# Patient Record
Sex: MALE | Race: BLACK OR AFRICAN AMERICAN | NOT HISPANIC OR LATINO | Employment: FULL TIME | URBAN - METROPOLITAN AREA
[De-identification: names, ages, dates, MRNs, and addresses within clinical notes are randomized per-mention and may not be internally consistent; named-entity substitution may affect disease eponyms.]

---

## 2023-12-01 ENCOUNTER — HOSPITAL ENCOUNTER (EMERGENCY)
Facility: HOSPITAL | Age: 48
Discharge: HOME | End: 2023-12-01
Attending: EMERGENCY MEDICINE
Payer: COMMERCIAL

## 2023-12-01 VITALS
RESPIRATION RATE: 16 BRPM | DIASTOLIC BLOOD PRESSURE: 95 MMHG | HEIGHT: 69 IN | BODY MASS INDEX: 28.14 KG/M2 | WEIGHT: 190 LBS | HEART RATE: 69 BPM | OXYGEN SATURATION: 98 % | TEMPERATURE: 99 F | SYSTOLIC BLOOD PRESSURE: 154 MMHG

## 2023-12-01 DIAGNOSIS — R10.11 RUQ ABDOMINAL PAIN: ICD-10-CM

## 2023-12-01 DIAGNOSIS — R10.13 EPIGASTRIC ABDOMINAL PAIN: ICD-10-CM

## 2023-12-01 DIAGNOSIS — K76.9 LIVER LESION: ICD-10-CM

## 2023-12-01 DIAGNOSIS — K21.00 GASTROESOPHAGEAL REFLUX DISEASE WITH ESOPHAGITIS WITHOUT HEMORRHAGE: Primary | ICD-10-CM

## 2023-12-01 LAB
ALBUMIN SERPL BCP-MCNC: 4.2 G/DL (ref 3.5–5.2)
ALP SERPL-CCNC: 68 U/L (ref 55–135)
ALT SERPL W/O P-5'-P-CCNC: 27 U/L (ref 10–44)
ANION GAP SERPL CALC-SCNC: 8 MMOL/L (ref 8–16)
AST SERPL-CCNC: 15 U/L (ref 10–40)
BASOPHILS # BLD AUTO: 0.03 K/UL (ref 0–0.2)
BASOPHILS NFR BLD: 0.6 % (ref 0–1.9)
BILIRUB SERPL-MCNC: 0.9 MG/DL (ref 0.1–1)
BUN SERPL-MCNC: 12 MG/DL (ref 6–20)
CALCIUM SERPL-MCNC: 9.4 MG/DL (ref 8.7–10.5)
CHLORIDE SERPL-SCNC: 106 MMOL/L (ref 95–110)
CO2 SERPL-SCNC: 27 MMOL/L (ref 23–29)
CREAT SERPL-MCNC: 0.9 MG/DL (ref 0.5–1.4)
DIFFERENTIAL METHOD: ABNORMAL
EOSINOPHIL # BLD AUTO: 0.2 K/UL (ref 0–0.5)
EOSINOPHIL NFR BLD: 3.3 % (ref 0–8)
ERYTHROCYTE [DISTWIDTH] IN BLOOD BY AUTOMATED COUNT: 14 % (ref 11.5–14.5)
EST. GFR  (NO RACE VARIABLE): >60 ML/MIN/1.73 M^2
GLUCOSE SERPL-MCNC: 98 MG/DL (ref 70–110)
HCT VFR BLD AUTO: 39.9 % (ref 40–54)
HGB BLD-MCNC: 12.8 G/DL (ref 14–18)
IMM GRANULOCYTES # BLD AUTO: 0 K/UL (ref 0–0.04)
IMM GRANULOCYTES NFR BLD AUTO: 0 % (ref 0–0.5)
LIPASE SERPL-CCNC: 23 U/L (ref 4–60)
LYMPHOCYTES # BLD AUTO: 1.2 K/UL (ref 1–4.8)
LYMPHOCYTES NFR BLD: 24.7 % (ref 18–48)
MCH RBC QN AUTO: 29 PG (ref 27–31)
MCHC RBC AUTO-ENTMCNC: 32.1 G/DL (ref 32–36)
MCV RBC AUTO: 91 FL (ref 82–98)
MONOCYTES # BLD AUTO: 0.5 K/UL (ref 0.3–1)
MONOCYTES NFR BLD: 9.6 % (ref 4–15)
NEUTROPHILS # BLD AUTO: 3 K/UL (ref 1.8–7.7)
NEUTROPHILS NFR BLD: 61.8 % (ref 38–73)
NRBC BLD-RTO: 0 /100 WBC
PLATELET # BLD AUTO: 220 K/UL (ref 150–450)
PMV BLD AUTO: 10.6 FL (ref 9.2–12.9)
POTASSIUM SERPL-SCNC: 3.6 MMOL/L (ref 3.5–5.1)
PROT SERPL-MCNC: 7.4 G/DL (ref 6–8.4)
RBC # BLD AUTO: 4.41 M/UL (ref 4.6–6.2)
SODIUM SERPL-SCNC: 141 MMOL/L (ref 136–145)
TROPONIN I SERPL HS-MCNC: 4.4 PG/ML (ref 0–14.9)
WBC # BLD AUTO: 4.78 K/UL (ref 3.9–12.7)

## 2023-12-01 PROCEDURE — 96374 THER/PROPH/DIAG INJ IV PUSH: CPT

## 2023-12-01 PROCEDURE — 93010 ELECTROCARDIOGRAM REPORT: CPT | Mod: ,,, | Performed by: GENERAL PRACTICE

## 2023-12-01 PROCEDURE — 93005 ELECTROCARDIOGRAM TRACING: CPT | Performed by: GENERAL PRACTICE

## 2023-12-01 PROCEDURE — 80053 COMPREHEN METABOLIC PANEL: CPT | Performed by: EMERGENCY MEDICINE

## 2023-12-01 PROCEDURE — 99285 EMERGENCY DEPT VISIT HI MDM: CPT | Mod: 25

## 2023-12-01 PROCEDURE — 83690 ASSAY OF LIPASE: CPT | Performed by: EMERGENCY MEDICINE

## 2023-12-01 PROCEDURE — 84484 ASSAY OF TROPONIN QUANT: CPT | Performed by: EMERGENCY MEDICINE

## 2023-12-01 PROCEDURE — 85025 COMPLETE CBC W/AUTO DIFF WBC: CPT | Performed by: EMERGENCY MEDICINE

## 2023-12-01 PROCEDURE — 25000003 PHARM REV CODE 250: Performed by: EMERGENCY MEDICINE

## 2023-12-01 PROCEDURE — 93010 EKG 12-LEAD: ICD-10-PCS | Mod: ,,, | Performed by: GENERAL PRACTICE

## 2023-12-01 RX ORDER — LIDOCAINE HYDROCHLORIDE 20 MG/ML
15 SOLUTION OROPHARYNGEAL ONCE
Status: COMPLETED | OUTPATIENT
Start: 2023-12-01 | End: 2023-12-01

## 2023-12-01 RX ORDER — PANTOPRAZOLE SODIUM 40 MG/1
40 TABLET, DELAYED RELEASE ORAL DAILY
Qty: 30 TABLET | Refills: 11 | Status: SHIPPED | OUTPATIENT
Start: 2023-12-01 | End: 2024-11-30

## 2023-12-01 RX ORDER — MAG HYDROX/ALUMINUM HYD/SIMETH 200-200-20
30 SUSPENSION, ORAL (FINAL DOSE FORM) ORAL ONCE
Status: COMPLETED | OUTPATIENT
Start: 2023-12-01 | End: 2023-12-01

## 2023-12-01 RX ORDER — FAMOTIDINE 10 MG/ML
20 INJECTION INTRAVENOUS
Status: COMPLETED | OUTPATIENT
Start: 2023-12-01 | End: 2023-12-01

## 2023-12-01 RX ADMIN — FAMOTIDINE 20 MG: 10 INJECTION, SOLUTION INTRAVENOUS at 04:12

## 2023-12-01 RX ADMIN — ALUMINUM HYDROXIDE, MAGNESIUM HYDROXIDE, AND SIMETHICONE 30 ML: 200; 200; 20 SUSPENSION ORAL at 04:12

## 2023-12-01 RX ADMIN — LIDOCAINE HYDROCHLORIDE 15 ML: 20 SOLUTION ORAL at 04:12

## 2023-12-02 NOTE — ED PROVIDER NOTES
Encounter Date: 12/1/2023       History     Chief Complaint   Patient presents with    Abdominal Pain     X a few weeks seen at Urgent Care, meds not working      48-year-old male complaining epigastric abdominal pain.  This has been ongoing the last several weeks.  It was dull and burning and radiates up into his lower chest.  It was worse after eating.  He was seen at urgent care and prescribed pantoprazole and Carafate and referred to GI clinic.  He completed a course of these medications but is still having symptoms.  He has not appointment with GI on Monday but wanted to come here to make sure nothing else was going on.    The history is provided by the patient.     Review of patient's allergies indicates:  No Known Allergies  No past medical history on file.  No past surgical history on file.  No family history on file.     Review of Systems   Gastrointestinal:  Positive for abdominal pain.   All other systems reviewed and are negative.      Physical Exam     Initial Vitals [12/01/23 1609]   BP Pulse Resp Temp SpO2   (!) 148/107 64 17 98.3 °F (36.8 °C) 97 %      MAP       --         Physical Exam    Nursing note and vitals reviewed.  Constitutional: He appears well-developed and well-nourished. He is not diaphoretic. No distress.   HENT:   Head: Normocephalic and atraumatic.   Eyes: Conjunctivae are normal.   Neck: Neck supple.   Normal range of motion.  Cardiovascular:  Normal rate.           Pulmonary/Chest: No respiratory distress.   Abdominal: He exhibits no distension. There is abdominal tenderness.   Mild epigastric tenderness, no rebound or guarding   Musculoskeletal:         General: No edema.      Cervical back: Normal range of motion and neck supple.     Neurological: He is alert. He has normal strength.   Skin: Skin is warm and dry. No rash noted. No erythema.   Psychiatric: He has a normal mood and affect.         ED Course   Procedures  Labs Reviewed   CBC W/ AUTO DIFFERENTIAL - Abnormal; Notable  for the following components:       Result Value    RBC 4.41 (*)     Hemoglobin 12.8 (*)     Hematocrit 39.9 (*)     All other components within normal limits   COMPREHENSIVE METABOLIC PANEL   LIPASE   TROPONIN I HIGH SENSITIVITY        ECG Results              EKG 12-lead (In process)  Result time 12/01/23 17:19:58      In process by Interface, Lab In [facility] (12/01/23 17:19:58)                   Narrative:    Test Reason : R10.11,    Vent. Rate : 064 BPM     Atrial Rate : 064 BPM     P-R Int : 142 ms          QRS Dur : 102 ms      QT Int : 416 ms       P-R-T Axes : 040 001 -05 degrees     QTc Int : 429 ms    Normal sinus rhythm  Incomplete right bundle branch block  Borderline Abnormal ECG  When compared with ECG of 07-APR-1994 13:42,  Incomplete right bundle branch block is now Present  Non-specific change in ST segment in Inferior leads  T wave inversion now evident in Inferior leads  T wave amplitude has decreased in Anterior-lateral leads    Referred By: [email or web address]   SELF           Confirmed By:                                   Imaging Results              US Abdomen Limited (Final result)  Result time 12/01/23 20:56:58      Final result by Kvng Tirado MD (12/01/23 20:56:58)                   Narrative:    EXAM DESCRIPTION: US ABDOMEN LIMITED 12/1/2023 5:27 PM CST    CLINICAL HISTORY: 48 years Male, RUQ abdominal pain; ;    COMPARISON:  None.    FINDINGS:  Visualized portions of the pancreas appear normal in echogenicity.    Visualized portions of the abdominal aorta and IVC show no suspicious finding.    The liver is diffusely echogenic and coarsened in neck texture. It measures 15.9 cm in length. Antegrade flow is documented within the main portal vein. An area of geographic hypoechogenicity is noted adjacent to the gallbladder fossa measuring 2.1 x 1.7 x 1.4 cm in size.    Gallbladder wall thickness measures 2 mm. No gallstones. Common bile duct diameter measures 5 mm.    Right kidney measures 10.3 x 5.1  x 5.7 cm in size. No hydronephrosis.    IMPRESSION:  No acute sonographic finding.    Echogenic liver, suggestive of hepatic steatosis. Concomitant geographic area of hypoechogenicity about the liver parenchyma adjacent to the gallbladder fossa measuring 2.1 x 1.7 x 1.4 cm in size presumably corresponds to an area of focal fatty sparing. Multiphasic liver MRI would be confirmatory.    Electronically signed by:  Kvng Tirado MD  12/01/2023 08:56 PM CST Workstation: [id number]                                     X-Ray Chest AP Portable (Final result)  Result time 12/01/23 18:08:20      Final result by Hai Flowers MD (12/01/23 18:08:20)                   Narrative:      EXAM: XR CHEST AP PORTABLE    HISTORY: Right upper quadrant pain.    COMPARISON:None    FINDINGS: A single AP view of the chest was obtained. The lungs are well-expanded and clear. There are no pleural effusions. There is no pneumothorax. The heart is borderline enlarged.    IMPRESSION:   No evidence of acute disease within the chest.    Electronically signed by:  Hai Flowers MD  12/01/2023 06:08 PM CST Workstation: [id number]                                     Medications   aluminum-magnesium hydroxide-simethicone 200-200-20 mg/5 mL suspension 30 mL (30 mLs Oral Given 12/1/23 1655)     And   LIDOcaine HCl 2% oral solution 15 mL (15 mLs Oral Given 12/1/23 1655)   famotidine (PF) injection 20 mg (20 mg Intravenous Given 12/1/23 1649)     Medical Decision Making  Abdominal exam is very benign.  No leukocytosis.  H&H is normal.  LFTs, lipase are normal.  Right upper quadrant ultrasound shows normal gallbladder.  It does show hepatomegaly as well as discrete area of decreased echogenicity and recommend outpatient MRI for further characterization.  I discussed these findings with the patient.  He did have improvement in his symptoms after GI cocktail and IV Pepcid.  I will refill his pantoprazole and he will follow up with GI as  scheduled.    Amount and/or Complexity of Data Reviewed  Labs: ordered.  Radiology: ordered.    Risk  OTC drugs.  Prescription drug management.                                      Clinical Impression:  Final diagnoses:  [R10.11] Epigastric abdominal pain  [R10.13] Epigastric abdominal pain  [K21.00] Gastroesophageal reflux disease with esophagitis without hemorrhage (Primary)  [K76.9] Liver lesion          ED Disposition Condition    Discharge Stable          ED Prescriptions       Medication Sig Dispense Start Date End Date Auth. Provider    pantoprazole (PROTONIX) 40 MG tablet Take 1 tablet (40 mg total) by mouth once daily. 30 tablet 12/1/2023 11/30/2024 Gordon Gay MD          Follow-up Information       Follow up With Specialties Details Why Contact Info    Gastroenterology clinic                 Gordon Gay MD  12/01/23 0790

## 2023-12-02 NOTE — DISCHARGE INSTRUCTIONS
You will need an outpatient MRI for further evaluation of the abnormal liver ultrasound. Follow-up with gastroenterology clinic on Monday as scheduled.

## 2023-12-04 ENCOUNTER — OFFICE VISIT (OUTPATIENT)
Dept: GASTROENTEROLOGY | Facility: CLINIC | Age: 48
End: 2023-12-04
Payer: COMMERCIAL

## 2023-12-04 VITALS
WEIGHT: 194 LBS | SYSTOLIC BLOOD PRESSURE: 145 MMHG | DIASTOLIC BLOOD PRESSURE: 99 MMHG | HEART RATE: 72 BPM | HEIGHT: 69 IN | BODY MASS INDEX: 28.73 KG/M2

## 2023-12-04 DIAGNOSIS — Z12.11 SCREENING FOR COLON CANCER: ICD-10-CM

## 2023-12-04 DIAGNOSIS — R10.13 EPIGASTRIC PAIN: Primary | ICD-10-CM

## 2023-12-04 DIAGNOSIS — R11.0 NAUSEA: ICD-10-CM

## 2023-12-04 DIAGNOSIS — K76.0 FATTY LIVER: ICD-10-CM

## 2023-12-04 DIAGNOSIS — R19.7 DIARRHEA, UNSPECIFIED TYPE: ICD-10-CM

## 2023-12-04 DIAGNOSIS — Z85.46 HISTORY OF PROSTATE CANCER: ICD-10-CM

## 2023-12-04 DIAGNOSIS — K62.5 RECTAL BLEED: ICD-10-CM

## 2023-12-04 DIAGNOSIS — R19.8 IRREGULAR BOWEL HABITS: ICD-10-CM

## 2023-12-04 PROCEDURE — 1159F MED LIST DOCD IN RCRD: CPT | Mod: CPTII,S$GLB,,

## 2023-12-04 PROCEDURE — 3008F PR BODY MASS INDEX (BMI) DOCUMENTED: ICD-10-PCS | Mod: CPTII,S$GLB,,

## 2023-12-04 PROCEDURE — 99999 PR PBB SHADOW E&M-EST. PATIENT-LVL III: CPT | Mod: PBBFAC,,,

## 2023-12-04 PROCEDURE — 99203 PR OFFICE/OUTPT VISIT, NEW, LEVL III, 30-44 MIN: ICD-10-PCS | Mod: S$GLB,,,

## 2023-12-04 PROCEDURE — 3077F SYST BP >= 140 MM HG: CPT | Mod: CPTII,S$GLB,,

## 2023-12-04 PROCEDURE — 3008F BODY MASS INDEX DOCD: CPT | Mod: CPTII,S$GLB,,

## 2023-12-04 PROCEDURE — 3080F PR MOST RECENT DIASTOLIC BLOOD PRESSURE >= 90 MM HG: ICD-10-PCS | Mod: CPTII,S$GLB,,

## 2023-12-04 PROCEDURE — 1160F PR REVIEW ALL MEDS BY PRESCRIBER/CLIN PHARMACIST DOCUMENTED: ICD-10-PCS | Mod: CPTII,S$GLB,,

## 2023-12-04 PROCEDURE — 99999 PR PBB SHADOW E&M-EST. PATIENT-LVL III: ICD-10-PCS | Mod: PBBFAC,,,

## 2023-12-04 PROCEDURE — 1159F PR MEDICATION LIST DOCUMENTED IN MEDICAL RECORD: ICD-10-PCS | Mod: CPTII,S$GLB,,

## 2023-12-04 PROCEDURE — 99203 OFFICE O/P NEW LOW 30 MIN: CPT | Mod: S$GLB,,,

## 2023-12-04 PROCEDURE — 3080F DIAST BP >= 90 MM HG: CPT | Mod: CPTII,S$GLB,,

## 2023-12-04 PROCEDURE — 3077F PR MOST RECENT SYSTOLIC BLOOD PRESSURE >= 140 MM HG: ICD-10-PCS | Mod: CPTII,S$GLB,,

## 2023-12-04 PROCEDURE — 1160F RVW MEDS BY RX/DR IN RCRD: CPT | Mod: CPTII,S$GLB,,

## 2023-12-04 RX ORDER — SUCRALFATE 1 G/1
1 TABLET ORAL 4 TIMES DAILY
COMMUNITY
Start: 2023-11-18

## 2023-12-04 NOTE — PROGRESS NOTES
Subjective:       Patient ID: Fede Dobbs is a 48 y.o. male Body mass index is 28.65 kg/m².    Chief Complaint: Abdominal Pain    This patient is new to me.  Referring Provider: Manuel Garza for abdominal pain.       ED visit 12/1/23  Medical Decision Making  Abdominal exam is very benign.  No leukocytosis.  H&H is normal.  LFTs, lipase are normal.  Right upper quadrant ultrasound shows normal gallbladder.  It does show hepatomegaly as well as discrete area of decreased echogenicity and recommend outpatient MRI for further characterization.  I discussed these findings with the patient.  He did have improvement in his symptoms after GI cocktail and IV Pepcid.  I will refill his pantoprazole and he will follow up with GI as scheduled.           Abdominal Pain  This is a new (started in November) problem. Episode onset: Pt presented to ED on 12/1/23 pt was seen at urgent care prior to ED and prescribed pantoprazole and Carafate,  He completed a course of these medications but is still having symptoms. The problem occurs daily. The problem has been unchanged. The pain is located in the epigastric region. The pain is at a severity of 3/10. The quality of the pain is burning and dull. The abdominal pain does not radiate. Associated symptoms include constipation (reports minor constipation when taking carafate but states, stool appeared like type 1 on bristol stool scale, typically has a BM once-twice a day rates stool type 4 on bristol stool scale, currently having diarrhea), diarrhea (reports diarrhea around the time abdominal pain started, states has 4-6bms daily occuring after eating, denies antibiotic use changes in medications travel ill contacts hospitalizations, denies nocturnal diarrhea), hematochezia (recently seen BRBPR on tissue small amount, not mixed in stool, no blood in toilet bowl, no hx of hemorrhoids, no rectal pain, last colonoscopy 2021 in [facility] no colon polyps found no issues  found) and nausea (c/o mild nausea with dry heaves). Pertinent negatives include no anorexia, arthralgias, belching, dysuria, fever, flatus, frequency, headaches, hematuria, melena, myalgias, vomiting or weight loss. Associated symptoms comments: Patient lives in [facility] currently here in [place] caring for his father, states his last colonoscopy was in 2021 in [place] does not have any history of colon polyps denies family history of colon cancer patient does not have a copy of last colonoscopy form of release signed to get copy of last colonoscopy.  -patient reports history of prostate cancer had prostate surgery in 2021 had to do radiation and chemo injections last 1 was in January of 2023 patient is seen by urologist Dr.Andrew Matamoros in [facility]. The pain is aggravated by eating (eating in general). He has tried proton pump inhibitors (has been taking pantoprazole 40mg orally daily since saturday, states did not find carafate helpful) for the symptoms. Prior diagnostic workup includes ultrasound (IMPRESSION:Echogenic liver,suggestive of hepatic steatosis Concomitant geographic area of hypoechogenicity about the liver parenchyma adjacent to the gallbladderfossa measuring2.1x1.7 x 1.4cm in size presumably corresponds to an area of focal fatty sparing). There is no history of abdominal surgery, colon cancer, Crohn's disease, gallstones, GERD (denies GERD symptoms), irritable bowel syndrome, pancreatitis, PUD (denies hx of ulcers or chronic nsaid use) or ulcerative colitis. Patient's medical history does not include kidney stones and UTI.       Review of Systems   Constitutional:  Negative for activity change, appetite change, fever, unexpected weight change and weight loss.   Gastrointestinal:  Positive for abdominal pain, anal bleeding, constipation (reports minor constipation when taking carafate but states, stool appeared like type 1 on bristol stool scale, typically has a BM  once-twice a day rates stool type 4 on bristol stool scale, currently having diarrhea), diarrhea (reports diarrhea around the time abdominal pain started, states has 4-6bms daily occuring after eating, denies antibiotic use changes in medications travel ill contacts hospitalizations, denies nocturnal diarrhea), hematochezia (recently seen BRBPR on tissue small amount, not mixed in stool, no blood in toilet bowl, no hx of hemorrhoids, no rectal pain, last colonoscopy 2021 in [facility] no colon polyps found no issues found) and nausea (c/o mild nausea with dry heaves). Negative for abdominal distention, anorexia, blood in stool, flatus, melena, rectal pain and vomiting.   Genitourinary:  Negative for dysuria, frequency and hematuria.   Musculoskeletal:  Negative for arthralgias and myalgias.   Neurological:  Negative for headaches.         No LMP for male patient.  History reviewed. No pertinent past medical history.  Past Surgical History:   Procedure Laterality Date    COLONOSCOPY      2021     Family History   Problem Relation Age of Onset    Colon cancer Neg Hx     Colon polyps Neg Hx     Crohn's disease Neg Hx     Ulcerative colitis Neg Hx         Wt Readings from Last 10 Encounters:   12/04/23 88 kg (194 lb 0.1 oz)   12/01/23 86.2 kg (190 lb)     Lab Results   Component Value Date    WBC 4.78 12/01/2023    HGB 12.8 (L) 12/01/2023    HCT 39.9 (L) 12/01/2023    MCV 91 12/01/2023     12/01/2023     CMP  Sodium   Date Value Ref Range Status   12/01/2023 141 136 - 145 mmol/L Final     Potassium   Date Value Ref Range Status   12/01/2023 3.6 3.5 - 5.1 mmol/L Final     Chloride   Date Value Ref Range Status   12/01/2023 106 95 - 110 mmol/L Final     CO2   Date Value Ref Range Status   12/01/2023 27 23 - 29 mmol/L Final     Glucose   Date Value Ref Range Status   12/01/2023 98 70 - 110 mg/dL Final     BUN   Date Value Ref Range Status   12/01/2023 12 6 - 20 mg/dL Final     Creatinine   Date Value Ref  "Range Status   12/01/2023 0.9 0.5 - 1.4 mg/dL Final     Calcium   Date Value Ref Range Status   12/01/2023 9.4 8.7 - 10.5 mg/dL Final     Total Protein   Date Value Ref Range Status   12/01/2023 7.4 6.0 - 8.4 g/dL Final     Albumin   Date Value Ref Range Status   12/01/2023 4.2 3.5 - 5.2 g/dL Final     Total Bilirubin   Date Value Ref Range Status   12/01/2023 0.9 0.1 - 1.0 mg/dL Final     Comment:     For infants and newborns, interpretation of results should be based  on gestational age, weight and in agreement with clinical  observations.    Premature Infant recommended reference ranges:  Up to 24 hours.............<8.0 mg/dL  Up to 48 hours............<12.0 mg/dL  3-5 days..................<15.0 mg/dL  6-29 days.................<15.0 mg/dL       Alkaline Phosphatase   Date Value Ref Range Status   12/01/2023 68 55 - 135 U/L Final     AST   Date Value Ref Range Status   12/01/2023 15 10 - 40 U/L Final     ALT   Date Value Ref Range Status   12/01/2023 27 10 - 44 U/L Final     Anion Gap   Date Value Ref Range Status   12/01/2023 8 8 - 16 mmol/L Final     Lab Results   Component Value Date    LIPASE 23 12/01/2023     No results found for: "LIPASERES"  No results found for: "TSH"    Reviewed prior medical records including hospital encounter 12/01/2023 radiology report of ultrasound abdomen 12/01/2023 & endoscopy history (see surgical history/procedures).    Objective:      Physical Exam  Vitals and nursing note reviewed.   Constitutional:       Appearance: Normal appearance. He is normal weight.   Cardiovascular:      Rate and Rhythm: Normal rate and regular rhythm.      Heart sounds: Normal heart sounds.   Pulmonary:      Breath sounds: Normal breath sounds.   Abdominal:      General: Bowel sounds are normal.      Palpations: Abdomen is soft.      Tenderness: There is no abdominal tenderness.   Skin:     General: Skin is warm and dry.      Coloration: Skin is not jaundiced.   Neurological:      Mental Status: He " is alert and oriented to person, place, and time.   Psychiatric:         Mood and Affect: Mood normal.         Behavior: Behavior normal.         Assessment:       1. Epigastric pain    2. Nausea    3. Diarrhea, unspecified type    4. Irregular bowel habits    5. Rectal bleed    6. Screening for colon cancer    7. Fatty liver    8. History of prostate cancer        Plan:       Epigastric pain  -     Case Request Endoscopy: EGD (ESOPHAGOGASTRODUODENOSCOPY)  - schedule EGD, discussed procedure with patient, including risks and benefits, patient verbalized understanding  - START Pantoprazole 40mg tablet orally twice a day before breakfast and before bedtime daily for 30 days after continue Pantoprazole 40mg orally daily  - Consider pepcid  - Avoid acidic foods attached to AVS examples of acidic foods   -Avoid/minimize NSAIDs such as ibuprofen (Advil, Motrin) and naproxen (Aleve and Naprosyn).    Nausea  -     Case Request Endoscopy: EGD (ESOPHAGOGASTRODUODENOSCOPY)  - Consider zofran  - Increase pantoprazole to twice daily    Diarrhea, unspecified type, Irregular bowel habits   -     Giardia / Cryptosporidum, EIA; Future; Expected date: 12/04/2023  -     Stool Exam-Ova,Cysts,Parasites; Future; Expected date: 12/04/2023  -     Clostridium difficile EIA; Future; Expected date: 12/04/2023  -     Stool culture; Future; Expected date: 12/04/2023  - - recommend OTC probiotic, such as Florastor or Culturelle, taken as directed on packaging  -  avoid lactose, alcohol, & caffeine  -  avoid known triggers  - Educated patient on If symptoms do not improve recommended a colonoscopy     Rectal bleed   - recommended to schedule a colonoscopy , discussed procedure with the patient, including risks and benefits, patient verbalized understanding, declined procedure at moment form of release signed to get copy of last colonoscopy   - discussed about different etiologies that can cause rectal bleeding, such as but not limited to  diverticulosis, polyps, colon mass, colon inflammation or infection, anal fissure or hemorrhoids a colonoscopy is needed to further investigate symptoms last colonoscopy in 2021.  - Avoid/minimize NSAIDs such as ibuprofen (Advil, Motrin) and naproxen (Aleve and Naprosyn).  - Avoid alcohol.  -Recommend high fiber diet (20-30 grams of fiber daily)/OTC fiber supplements daily as directed, such as Benefiber or Metamucil.    Screening for colon cancer   -Form of release signed to get copy of last colonoscopy    Fatty liver  -     MRI Abdomen W WO Contrast; Future; Expected date: 12/04/2023  - For fatty liver recommend: low fat, low cholesterol diet, maintain good control of blood sugars and cholesterol levels, exercise, weight loss (if overweight), minimize/avoid alcohol and tylenol products, & follow-up with PCP for continued evaluation and management; if specialist is needed, recommend seeing hepatology.    History of prostate cancer   -continue follow up with urologist      Follow up in about 4 weeks (around 1/1/2024), or if symptoms worsen or fail to improve.      If no improvement in symptoms or symptoms worsen, call/follow-up at clinic or go to ER.       [facility]  [facility] - GASTROENTEROLOGY  [facility], [place]     Dictation software program was used for this note. Please expect some simple typographical  errors in this note.    Encounter includes face to face time and non-face to face time preparing to see the patient (eg, review of tests), obtaining and/or reviewing separately obtained history, documenting clinical information in the electronic or other health record, independently interpreting results (not separately reported) and communicating results to the patient/family/caregiver, or care coordination (not separately reported).

## 2023-12-04 NOTE — H&P (VIEW-ONLY)
Subjective:       Patient ID: Fede Dobbs is a 48 y.o. male Body mass index is 28.65 kg/m².    Chief Complaint: Abdominal Pain    This patient is new to me.  Referring Provider: Manuel Garza for abdominal pain.       ED visit 12/1/23  Medical Decision Making  Abdominal exam is very benign.  No leukocytosis.  H&H is normal.  LFTs, lipase are normal.  Right upper quadrant ultrasound shows normal gallbladder.  It does show hepatomegaly as well as discrete area of decreased echogenicity and recommend outpatient MRI for further characterization.  I discussed these findings with the patient.  He did have improvement in his symptoms after GI cocktail and IV Pepcid.  I will refill his pantoprazole and he will follow up with GI as scheduled.           Abdominal Pain  This is a new (started in November) problem. Episode onset: Pt presented to ED on 12/1/23 pt was seen at urgent care prior to ED and prescribed pantoprazole and Carafate,  He completed a course of these medications but is still having symptoms. The problem occurs daily. The problem has been unchanged. The pain is located in the epigastric region. The pain is at a severity of 3/10. The quality of the pain is burning and dull. The abdominal pain does not radiate. Associated symptoms include constipation (reports minor constipation when taking carafate but states, stool appeared like type 1 on bristol stool scale, typically has a BM once-twice a day rates stool type 4 on bristol stool scale, currently having diarrhea), diarrhea (reports diarrhea around the time abdominal pain started, states has 4-6bms daily occuring after eating, denies antibiotic use changes in medications travel ill contacts hospitalizations, denies nocturnal diarrhea), hematochezia (recently seen BRBPR on tissue small amount, not mixed in stool, no blood in toilet bowl, no hx of hemorrhoids, no rectal pain, last colonoscopy 2021 in [facility] no colon polyps found no issues  found) and nausea (c/o mild nausea with dry heaves). Pertinent negatives include no anorexia, arthralgias, belching, dysuria, fever, flatus, frequency, headaches, hematuria, melena, myalgias, vomiting or weight loss. Associated symptoms comments: Patient lives in [facility] currently here in [place] caring for his father, states his last colonoscopy was in 2021 in [place] does not have any history of colon polyps denies family history of colon cancer patient does not have a copy of last colonoscopy form of release signed to get copy of last colonoscopy.  -patient reports history of prostate cancer had prostate surgery in 2021 had to do radiation and chemo injections last 1 was in January of 2023 patient is seen by urologist Dr.Andrew Matamoros in [facility]. The pain is aggravated by eating (eating in general). He has tried proton pump inhibitors (has been taking pantoprazole 40mg orally daily since saturday, states did not find carafate helpful) for the symptoms. Prior diagnostic workup includes ultrasound (IMPRESSION:Echogenic liver,suggestive of hepatic steatosis Concomitant geographic area of hypoechogenicity about the liver parenchyma adjacent to the gallbladderfossa measuring2.1x1.7 x 1.4cm in size presumably corresponds to an area of focal fatty sparing). There is no history of abdominal surgery, colon cancer, Crohn's disease, gallstones, GERD (denies GERD symptoms), irritable bowel syndrome, pancreatitis, PUD (denies hx of ulcers or chronic nsaid use) or ulcerative colitis. Patient's medical history does not include kidney stones and UTI.       Review of Systems   Constitutional:  Negative for activity change, appetite change, fever, unexpected weight change and weight loss.   Gastrointestinal:  Positive for abdominal pain, anal bleeding, constipation (reports minor constipation when taking carafate but states, stool appeared like type 1 on bristol stool scale, typically has a BM  once-twice a day rates stool type 4 on bristol stool scale, currently having diarrhea), diarrhea (reports diarrhea around the time abdominal pain started, states has 4-6bms daily occuring after eating, denies antibiotic use changes in medications travel ill contacts hospitalizations, denies nocturnal diarrhea), hematochezia (recently seen BRBPR on tissue small amount, not mixed in stool, no blood in toilet bowl, no hx of hemorrhoids, no rectal pain, last colonoscopy 2021 in [facility] no colon polyps found no issues found) and nausea (c/o mild nausea with dry heaves). Negative for abdominal distention, anorexia, blood in stool, flatus, melena, rectal pain and vomiting.   Genitourinary:  Negative for dysuria, frequency and hematuria.   Musculoskeletal:  Negative for arthralgias and myalgias.   Neurological:  Negative for headaches.         No LMP for male patient.  History reviewed. No pertinent past medical history.  Past Surgical History:   Procedure Laterality Date    COLONOSCOPY      2021     Family History   Problem Relation Age of Onset    Colon cancer Neg Hx     Colon polyps Neg Hx     Crohn's disease Neg Hx     Ulcerative colitis Neg Hx         Wt Readings from Last 10 Encounters:   12/04/23 88 kg (194 lb 0.1 oz)   12/01/23 86.2 kg (190 lb)     Lab Results   Component Value Date    WBC 4.78 12/01/2023    HGB 12.8 (L) 12/01/2023    HCT 39.9 (L) 12/01/2023    MCV 91 12/01/2023     12/01/2023     CMP  Sodium   Date Value Ref Range Status   12/01/2023 141 136 - 145 mmol/L Final     Potassium   Date Value Ref Range Status   12/01/2023 3.6 3.5 - 5.1 mmol/L Final     Chloride   Date Value Ref Range Status   12/01/2023 106 95 - 110 mmol/L Final     CO2   Date Value Ref Range Status   12/01/2023 27 23 - 29 mmol/L Final     Glucose   Date Value Ref Range Status   12/01/2023 98 70 - 110 mg/dL Final     BUN   Date Value Ref Range Status   12/01/2023 12 6 - 20 mg/dL Final     Creatinine   Date Value Ref  "Range Status   12/01/2023 0.9 0.5 - 1.4 mg/dL Final     Calcium   Date Value Ref Range Status   12/01/2023 9.4 8.7 - 10.5 mg/dL Final     Total Protein   Date Value Ref Range Status   12/01/2023 7.4 6.0 - 8.4 g/dL Final     Albumin   Date Value Ref Range Status   12/01/2023 4.2 3.5 - 5.2 g/dL Final     Total Bilirubin   Date Value Ref Range Status   12/01/2023 0.9 0.1 - 1.0 mg/dL Final     Comment:     For infants and newborns, interpretation of results should be based  on gestational age, weight and in agreement with clinical  observations.    Premature Infant recommended reference ranges:  Up to 24 hours.............<8.0 mg/dL  Up to 48 hours............<12.0 mg/dL  3-5 days..................<15.0 mg/dL  6-29 days.................<15.0 mg/dL       Alkaline Phosphatase   Date Value Ref Range Status   12/01/2023 68 55 - 135 U/L Final     AST   Date Value Ref Range Status   12/01/2023 15 10 - 40 U/L Final     ALT   Date Value Ref Range Status   12/01/2023 27 10 - 44 U/L Final     Anion Gap   Date Value Ref Range Status   12/01/2023 8 8 - 16 mmol/L Final     Lab Results   Component Value Date    LIPASE 23 12/01/2023     No results found for: "LIPASERES"  No results found for: "TSH"    Reviewed prior medical records including hospital encounter 12/01/2023 radiology report of ultrasound abdomen 12/01/2023 & endoscopy history (see surgical history/procedures).    Objective:      Physical Exam  Vitals and nursing note reviewed.   Constitutional:       Appearance: Normal appearance. He is normal weight.   Cardiovascular:      Rate and Rhythm: Normal rate and regular rhythm.      Heart sounds: Normal heart sounds.   Pulmonary:      Breath sounds: Normal breath sounds.   Abdominal:      General: Bowel sounds are normal.      Palpations: Abdomen is soft.      Tenderness: There is no abdominal tenderness.   Skin:     General: Skin is warm and dry.      Coloration: Skin is not jaundiced.   Neurological:      Mental Status: He " is alert and oriented to person, place, and time.   Psychiatric:         Mood and Affect: Mood normal.         Behavior: Behavior normal.         Assessment:       1. Epigastric pain    2. Nausea    3. Diarrhea, unspecified type    4. Irregular bowel habits    5. Rectal bleed    6. Screening for colon cancer    7. Fatty liver    8. History of prostate cancer        Plan:       Epigastric pain  -     Case Request Endoscopy: EGD (ESOPHAGOGASTRODUODENOSCOPY)  - schedule EGD, discussed procedure with patient, including risks and benefits, patient verbalized understanding  - START Pantoprazole 40mg tablet orally twice a day before breakfast and before bedtime daily for 30 days after continue Pantoprazole 40mg orally daily  - Consider pepcid  - Avoid acidic foods attached to AVS examples of acidic foods   -Avoid/minimize NSAIDs such as ibuprofen (Advil, Motrin) and naproxen (Aleve and Naprosyn).    Nausea  -     Case Request Endoscopy: EGD (ESOPHAGOGASTRODUODENOSCOPY)  - Consider zofran  - Increase pantoprazole to twice daily    Diarrhea, unspecified type, Irregular bowel habits   -     Giardia / Cryptosporidum, EIA; Future; Expected date: 12/04/2023  -     Stool Exam-Ova,Cysts,Parasites; Future; Expected date: 12/04/2023  -     Clostridium difficile EIA; Future; Expected date: 12/04/2023  -     Stool culture; Future; Expected date: 12/04/2023  - - recommend OTC probiotic, such as Florastor or Culturelle, taken as directed on packaging  -  avoid lactose, alcohol, & caffeine  -  avoid known triggers  - Educated patient on If symptoms do not improve recommended a colonoscopy     Rectal bleed   - recommended to schedule a colonoscopy , discussed procedure with the patient, including risks and benefits, patient verbalized understanding, declined procedure at moment form of release signed to get copy of last colonoscopy   - discussed about different etiologies that can cause rectal bleeding, such as but not limited to  diverticulosis, polyps, colon mass, colon inflammation or infection, anal fissure or hemorrhoids a colonoscopy is needed to further investigate symptoms last colonoscopy in 2021.  - Avoid/minimize NSAIDs such as ibuprofen (Advil, Motrin) and naproxen (Aleve and Naprosyn).  - Avoid alcohol.  -Recommend high fiber diet (20-30 grams of fiber daily)/OTC fiber supplements daily as directed, such as Benefiber or Metamucil.    Screening for colon cancer   -Form of release signed to get copy of last colonoscopy    Fatty liver  -     MRI Abdomen W WO Contrast; Future; Expected date: 12/04/2023  - For fatty liver recommend: low fat, low cholesterol diet, maintain good control of blood sugars and cholesterol levels, exercise, weight loss (if overweight), minimize/avoid alcohol and tylenol products, & follow-up with PCP for continued evaluation and management; if specialist is needed, recommend seeing hepatology.    History of prostate cancer   -continue follow up with urologist      Follow up in about 4 weeks (around 1/1/2024), or if symptoms worsen or fail to improve.      If no improvement in symptoms or symptoms worsen, call/follow-up at clinic or go to ER.       [facility]  [facility]  [facility] [place]     Dictation software program was used for this note. Please expect some simple typographical  errors in this note.    Encounter includes face to face time and non-face to face time preparing to see the patient (eg, review of tests), obtaining and/or reviewing separately obtained history, documenting clinical information in the electronic or other health record, independently interpreting results (not separately reported) and communicating results to the patient/family/caregiver, or care coordination (not separately reported).

## 2023-12-05 ENCOUNTER — TELEPHONE (OUTPATIENT)
Dept: GASTROENTEROLOGY | Facility: CLINIC | Age: 48
End: 2023-12-05
Payer: COMMERCIAL

## 2023-12-05 ENCOUNTER — HOSPITAL ENCOUNTER (OUTPATIENT)
Dept: RADIOLOGY | Facility: HOSPITAL | Age: 48
Discharge: HOME | End: 2023-12-05
Payer: COMMERCIAL

## 2023-12-05 DIAGNOSIS — K76.0 FATTY LIVER: ICD-10-CM

## 2023-12-05 PROCEDURE — A9585 GADOBUTROL INJECTION: HCPCS

## 2023-12-05 PROCEDURE — 74183 MRI ABD W/O CNTR FLWD CNTR: CPT | Mod: 26,,, | Performed by: RADIOLOGY

## 2023-12-05 PROCEDURE — 74183 MRI ABD W/O CNTR FLWD CNTR: CPT | Mod: TC

## 2023-12-05 PROCEDURE — 25500020 PHARM REV CODE 255

## 2023-12-05 PROCEDURE — 74183 MRI ABDOMEN W WO CONTRAST: ICD-10-PCS | Mod: 26,,, | Performed by: RADIOLOGY

## 2023-12-05 RX ORDER — GADOBUTROL 604.72 MG/ML
INJECTION INTRAVENOUS
Status: COMPLETED
Start: 2023-12-05 | End: 2023-12-05

## 2023-12-05 RX ADMIN — GADOBUTROL 8 ML: 604.72 INJECTION INTRAVENOUS at 03:12

## 2023-12-05 NOTE — TELEPHONE ENCOUNTER
Call placed to Mr. Mistry in regards to message received. Offered him 12/14 at 1330 with Dr. Ortiz. He accepted. No further issues noted.

## 2023-12-05 NOTE — TELEPHONE ENCOUNTER
----- Message from Sophia Cisneros sent at 12/5/2023 12:39 PM CST -----  Contact: pt  Type:  Needs Medical Advice    Who Called: pt    Would the patient rather a call back or a response via [facility]? Call back    Best Call Back Number: [phone]    Additional Information: wants to see if anyone has canceled so he can reschedule his scope to be earlier than February.    Please call to advise  Thanks

## 2023-12-06 ENCOUNTER — TELEPHONE (OUTPATIENT)
Dept: GASTROENTEROLOGY | Facility: CLINIC | Age: 48
End: 2023-12-06
Payer: COMMERCIAL

## 2023-12-06 NOTE — TELEPHONE ENCOUNTER
Called Dr Tobias Beasley's office: [phone] to obtain fax number. Automated recording provided fax number: [phone]. Faxed auth for release of confidential information signed/dated form to number above. Fax confirmation received.

## 2023-12-14 ENCOUNTER — ANESTHESIA EVENT (OUTPATIENT)
Dept: ENDOSCOPY | Facility: HOSPITAL | Age: 48
End: 2023-12-14
Payer: COMMERCIAL

## 2023-12-14 ENCOUNTER — ANESTHESIA (OUTPATIENT)
Dept: ENDOSCOPY | Facility: HOSPITAL | Age: 48
End: 2023-12-14
Payer: COMMERCIAL

## 2023-12-14 ENCOUNTER — HOSPITAL ENCOUNTER (OUTPATIENT)
Facility: HOSPITAL | Age: 48
Discharge: HOME | End: 2023-12-14
Attending: INTERNAL MEDICINE | Admitting: INTERNAL MEDICINE
Payer: COMMERCIAL

## 2023-12-14 DIAGNOSIS — K29.70 GASTRITIS, PRESENCE OF BLEEDING UNSPECIFIED, UNSPECIFIED CHRONICITY, UNSPECIFIED GASTRITIS TYPE: ICD-10-CM

## 2023-12-14 DIAGNOSIS — R10.9 ABDOMINAL PAIN: ICD-10-CM

## 2023-12-14 DIAGNOSIS — K44.9 HIATAL HERNIA: Primary | ICD-10-CM

## 2023-12-14 PROCEDURE — 25000003 PHARM REV CODE 250: Performed by: NURSE ANESTHETIST, CERTIFIED REGISTERED

## 2023-12-14 PROCEDURE — D9220A PRA ANESTHESIA: Mod: CRNA,,, | Performed by: NURSE ANESTHETIST, CERTIFIED REGISTERED

## 2023-12-14 PROCEDURE — D9220A PRA ANESTHESIA: ICD-10-PCS | Mod: CRNA,,, | Performed by: NURSE ANESTHETIST, CERTIFIED REGISTERED

## 2023-12-14 PROCEDURE — 25000003 PHARM REV CODE 250: Performed by: INTERNAL MEDICINE

## 2023-12-14 PROCEDURE — 43239 PR EGD, FLEX, W/BIOPSY, SGL/MULTI: ICD-10-PCS | Mod: ,,, | Performed by: INTERNAL MEDICINE

## 2023-12-14 PROCEDURE — 88305 TISSUE EXAM BY PATHOLOGIST: CPT | Mod: TC | Performed by: PATHOLOGY

## 2023-12-14 PROCEDURE — 43239 EGD BIOPSY SINGLE/MULTIPLE: CPT | Performed by: INTERNAL MEDICINE

## 2023-12-14 PROCEDURE — 37000008 HC ANESTHESIA 1ST 15 MINUTES: Performed by: INTERNAL MEDICINE

## 2023-12-14 PROCEDURE — D9220A PRA ANESTHESIA: Mod: ANES,,, | Performed by: ANESTHESIOLOGY

## 2023-12-14 PROCEDURE — D9220A PRA ANESTHESIA: ICD-10-PCS | Mod: ANES,,, | Performed by: ANESTHESIOLOGY

## 2023-12-14 PROCEDURE — 27201012 HC FORCEPS, HOT/COLD, DISP: Performed by: INTERNAL MEDICINE

## 2023-12-14 PROCEDURE — 88312 SPECIAL STAINS GROUP 1: CPT | Mod: TC | Performed by: PATHOLOGY

## 2023-12-14 PROCEDURE — 43239 EGD BIOPSY SINGLE/MULTIPLE: CPT | Mod: ,,, | Performed by: INTERNAL MEDICINE

## 2023-12-14 RX ORDER — SODIUM CHLORIDE 9 MG/ML
INJECTION, SOLUTION INTRAVENOUS CONTINUOUS
Status: DISCONTINUED | OUTPATIENT
Start: 2023-12-14 | End: 2023-12-14 | Stop reason: HOSPADM

## 2023-12-14 RX ORDER — PROPOFOL 10 MG/ML
VIAL (ML) INTRAVENOUS
Status: DISCONTINUED | OUTPATIENT
Start: 2023-12-14 | End: 2023-12-14

## 2023-12-14 RX ORDER — ONDANSETRON 2 MG/ML
INJECTION INTRAMUSCULAR; INTRAVENOUS
Status: DISCONTINUED | OUTPATIENT
Start: 2023-12-14 | End: 2023-12-14

## 2023-12-14 RX ORDER — LIDOCAINE HYDROCHLORIDE 20 MG/ML
INJECTION INTRAVENOUS
Status: DISCONTINUED | OUTPATIENT
Start: 2023-12-14 | End: 2023-12-14

## 2023-12-14 RX ADMIN — Medication 30 MG: at 01:12

## 2023-12-14 RX ADMIN — SODIUM CHLORIDE: 9 INJECTION, SOLUTION INTRAVENOUS at 01:12

## 2023-12-14 RX ADMIN — LIDOCAINE HYDROCHLORIDE 100 MG: 20 INJECTION, SOLUTION INTRAVENOUS at 01:12

## 2023-12-14 RX ADMIN — Medication 150 MG: at 01:12

## 2023-12-14 RX ADMIN — GLYCOPYRROLATE 0.1 MG: 0.2 INJECTION, SOLUTION INTRAMUSCULAR; INTRAVITREAL at 01:12

## 2023-12-14 NOTE — PLAN OF CARE
Vss, michele po fluids, denies pain, ambulates easily. IV removed, catheter intact. Discharge instructions provided and states understanding. States ready to go home.  Discharged from facility with family per wheelchair.

## 2023-12-14 NOTE — ANESTHESIA PREPROCEDURE EVALUATION
12/14/2023  Fede Dobbs is a 48 y.o., male.      Pre-op Assessment    I have reviewed the Patient Summary Reports.     I have reviewed the Nursing Notes. I have reviewed the NPO Status.   I have reviewed the Medications.     Review of Systems  Anesthesia Hx:  No problems with previous Anesthesia                Cardiovascular:  Cardiovascular Normal                                            Pulmonary:  Pulmonary Normal                       Neurological:  Neurology Normal                                          Physical Exam  General: Well nourished    Airway:  Mallampati: II   Mouth Opening: Normal  TM Distance: Normal  Neck ROM: Normal ROM        Anesthesia Plan  Type of Anesthesia, risks & benefits discussed:    Anesthesia Type: Gen Natural Airway  Intra-op Monitoring Plan: Standard ASA Monitors  Induction:  IV  Informed Consent: Informed consent signed with the Patient and all parties understand the risks and agree with anesthesia plan.  All questions answered.   ASA Score: 1    Ready For Surgery From Anesthesia Perspective.     .

## 2023-12-14 NOTE — TRANSFER OF CARE
"Anesthesia Transfer of Care Note    Patient: Fede Dobbs    Procedure(s) Performed: Procedure(s) (LRB):  EGD (ESOPHAGOGASTRODUODENOSCOPY) check ride (N/A)    Patient location: PACU    Anesthesia Type: general    Transport from OR: Transported from OR on room air with adequate spontaneous ventilation    Post pain: adequate analgesia    Post assessment: no apparent anesthetic complications and tolerated procedure well    Post vital signs: stable    Level of consciousness: sedated    Nausea/Vomiting: no nausea/vomiting    Complications: none    Transfer of care protocol was followed      Last vitals: Visit Vitals  BP (!) 154/97 (BP Location: Left arm, Patient Position: Lying)   Pulse 65   Temp 36.4 °C (97.5 °F) (Skin)   Resp 16   Ht 5' 9" (1.753 m)   Wt 88 kg (194 lb)   SpO2 99%   BMI 28.65 kg/m²     "

## 2023-12-14 NOTE — PROVATION PATIENT INSTRUCTIONS
Discharge Summary/Instructions after an Endoscopic Procedure  Patient Name: Fede Dobbs  Patient MRN: [id number]  Patient YOB: 1975  Thursday, December 14, 2023  Adi Ortiz MD  Dear patient,  As a result of recent federal legislation (The Federal Cures Act), you may   receive lab or pathology results from your procedure in your [facility]   account before your physician is able to contact you. Your physician or   their representative will relay the results to you with their   recommendations at their soonest availability.  Thank you,  RESTRICTIONS:  During your procedure today, you received medications for sedation.  These   medications may affect your judgment, balance and coordination.  Therefore,   for 24 hours, you have the following restrictions:   - DO NOT drive a car, operate machinery, make legal/financial decisions,   sign important papers or drink alcohol.    ACTIVITY:  Today: no heavy lifting, straining or running due to procedural   sedation/anesthesia.  The following day: return to full activity including work.  DIET:  Eat and drink normally unless instructed otherwise.     TREATMENT FOR COMMON SIDE EFFECTS:  - Mild abdominal pain, nausea, belching, bloating or excessive gas:  rest,   eat lightly and use a heating pad.  - Sore Throat: treat with throat lozenges and/or gargle with warm salt   water.  - Because air was used during the procedure, expelling large amounts of air   from your rectum or belching is normal.  - If a bowel prep was taken, you may not have a bowel movement for 1-3 days.    This is normal.  SYMPTOMS TO WATCH FOR AND REPORT TO YOUR PHYSICIAN:  1. Abdominal pain or bloating, other than gas cramps.  2. Chest pain.  3. Back pain.  4. Signs of infection such as: chills or fever occurring within 24 hours   after the procedure.  5. Rectal bleeding, which would show as bright red, maroon, or black stools.   (A tablespoon of blood from the rectum is not serious,  especially if   hemorrhoids are present.)  6. Vomiting.  7. Weakness or dizziness.  GO DIRECTLY TO THE NEAREST EMERGENCY ROOM IF YOU HAVE ANY OF THE FOLLOWING:      Difficulty breathing              Chills and/or fever over 101 F   Persistent vomiting and/or vomiting blood   Severe abdominal pain   Severe chest pain   Black, tarry stools   Bleeding- more than one tablespoon   Any other symptom or condition that you feel may need urgent attention  Your doctor recommends these additional instructions:  If any biopsies were taken, your doctors clinic will contact you in 1 to 2   weeks with any results.  - Patient has a contact number available for emergencies.  The signs and   symptoms of potential delayed complications were discussed with the   patient.  Return to normal activities tomorrow.  Written discharge   instructions were provided to the patient.   - Resume previous diet.   - Continue present medications.   - No aspirin, ibuprofen, naproxen, or other non-steroidal anti-inflammatory   drugs.   - Await pathology results.   - Discharge patient to home (ambulatory).   - Follow an antireflux regimen.   - Return to GI clinic after studies are complete.  For questions, problems or results please call your physician - Adi Ortiz MD at Work:  [phone].  [facility], EMERGENCY ROOM PHONE NUMBER: [phone]  IF A COMPLICATION OR EMERGENCY SITUATION ARISES AND YOU ARE UNABLE TO REACH   YOUR PHYSICIAN - GO DIRECTLY TO THE EMERGENCY ROOM.  Adi Ortiz MD  12/14/2023 1:59:17 PM  This report has been verified and signed electronically.  Dear patient,  As a result of recent federal legislation (The Federal Cures Act), you may   receive lab or pathology results from your procedure in your [facility]   account before your physician is able to contact you. Your physician or   their representative will relay the results to you with their   recommendations at their soonest availability.  Thank  you,  PROVATION

## 2023-12-15 VITALS
WEIGHT: 194 LBS | OXYGEN SATURATION: 98 % | BODY MASS INDEX: 28.73 KG/M2 | SYSTOLIC BLOOD PRESSURE: 142 MMHG | RESPIRATION RATE: 14 BRPM | HEIGHT: 69 IN | TEMPERATURE: 98 F | DIASTOLIC BLOOD PRESSURE: 89 MMHG | HEART RATE: 74 BPM

## 2023-12-15 NOTE — ANESTHESIA POSTPROCEDURE EVALUATION
Anesthesia Post Evaluation    Patient: Fede Dobbs    Procedure(s) Performed: Procedure(s) (LRB):  EGD (ESOPHAGOGASTRODUODENOSCOPY) check ride (N/A)    Final Anesthesia Type: general      Patient location during evaluation: PACU  Patient participation: Yes- Able to Participate  Level of consciousness: awake and alert  Post-procedure vital signs: reviewed and stable  Pain management: adequate  Airway patency: patent    PONV status at discharge: No PONV  Anesthetic complications: no      Cardiovascular status: blood pressure returned to baseline  Respiratory status: unassisted  Hydration status: euvolemic  Follow-up not needed.              Vitals Value Taken Time   /89 12/14/23 1420   Temp 36.4 °C (97.5 °F) 12/14/23 1420   Pulse 74 12/14/23 1420   Resp 14 12/14/23 1420   SpO2 98 % 12/14/23 1420         Event Time   Out of Recovery 14:32:49         Pain/Neptali Score: Neptali Score: 10 (12/14/2023  2:25 PM)

## 2023-12-15 NOTE — PROGRESS NOTES
Please notify patient that biopsies reviewed and showed no bacteria.  Continue current meds and follow up as previously planned.  
170.18

## 2024-03-01 ENCOUNTER — TELEPHONE (OUTPATIENT)
Dept: GASTROENTEROLOGY | Facility: CLINIC | Age: 49
End: 2024-03-01
Payer: COMMERCIAL

## 2024-03-01 NOTE — TELEPHONE ENCOUNTER
Refaxed med records request to Dr. Tobias Beasley [facility] requesting colonoscopy and path report. Fax confirmation received.

## 2024-03-05 ENCOUNTER — TELEPHONE (OUTPATIENT)
Dept: GASTROENTEROLOGY | Facility: CLINIC | Age: 49
End: 2024-03-05
Payer: COMMERCIAL

## 2024-03-05 NOTE — TELEPHONE ENCOUNTER
Received surgical path medical records regarding prostate via fax from [facility]. However, requested colonoscopy and path report records. Forwarding records received to DELIA Gleason for review.

## 2024-03-08 ENCOUNTER — TREATMENT PLANNING (OUTPATIENT)
Dept: GASTROENTEROLOGY | Facility: CLINIC | Age: 49
End: 2024-03-08
Payer: COMMERCIAL

## 2024-11-22 DIAGNOSIS — N28.1 RENAL CYST: ICD-10-CM

## 2024-11-22 DIAGNOSIS — N28.1 ACQUIRED CYST OF KIDNEY: Primary | ICD-10-CM

## 2024-11-25 ENCOUNTER — TELEPHONE (OUTPATIENT)
Dept: RADIOLOGY | Facility: HOSPITAL | Age: 49
End: 2024-11-25

## 2024-11-26 ENCOUNTER — HOSPITAL ENCOUNTER (OUTPATIENT)
Dept: RADIOLOGY | Facility: HOSPITAL | Age: 49
Discharge: HOME | End: 2024-11-26

## 2024-11-26 DIAGNOSIS — N28.1 COMPLEX RENAL CYST: ICD-10-CM

## 2024-11-26 DIAGNOSIS — N28.1 COMPLEX RENAL CYST: Primary | ICD-10-CM

## 2024-11-26 PROCEDURE — 74178 CT ABD&PLV WO CNTR FLWD CNTR: CPT | Mod: TC

## 2024-11-26 PROCEDURE — 25500020 PHARM REV CODE 255

## 2024-11-26 RX ADMIN — IOHEXOL 100 ML: 350 INJECTION, SOLUTION INTRAVENOUS at 04:11
